# Patient Record
Sex: MALE | Race: OTHER | HISPANIC OR LATINO | URBAN - METROPOLITAN AREA
[De-identification: names, ages, dates, MRNs, and addresses within clinical notes are randomized per-mention and may not be internally consistent; named-entity substitution may affect disease eponyms.]

---

## 2018-01-30 ENCOUNTER — OUTPATIENT (OUTPATIENT)
Dept: OUTPATIENT SERVICES | Facility: HOSPITAL | Age: 18
LOS: 1 days | Discharge: HOME | End: 2018-01-30

## 2018-01-30 DIAGNOSIS — Z00.00 ENCOUNTER FOR GENERAL ADULT MEDICAL EXAMINATION WITHOUT ABNORMAL FINDINGS: ICD-10-CM

## 2018-01-30 DIAGNOSIS — R05 COUGH: ICD-10-CM

## 2018-02-01 ENCOUNTER — OUTPATIENT (OUTPATIENT)
Dept: OUTPATIENT SERVICES | Facility: HOSPITAL | Age: 18
LOS: 1 days | Discharge: HOME | End: 2018-02-01

## 2018-02-01 DIAGNOSIS — R05 COUGH: ICD-10-CM

## 2022-06-02 PROBLEM — Z00.00 ENCOUNTER FOR PREVENTIVE HEALTH EXAMINATION: Status: ACTIVE | Noted: 2022-06-02

## 2023-03-02 ENCOUNTER — NON-APPOINTMENT (OUTPATIENT)
Age: 23
End: 2023-03-02

## 2023-03-02 ENCOUNTER — APPOINTMENT (OUTPATIENT)
Dept: ORTHOPEDIC SURGERY | Facility: CLINIC | Age: 23
End: 2023-03-02
Payer: OTHER MISCELLANEOUS

## 2023-03-02 VITALS — WEIGHT: 170 LBS | BODY MASS INDEX: 25.76 KG/M2 | HEIGHT: 68 IN

## 2023-03-02 PROCEDURE — 72040 X-RAY EXAM NECK SPINE 2-3 VW: CPT

## 2023-03-02 PROCEDURE — 73030 X-RAY EXAM OF SHOULDER: CPT | Mod: RT

## 2023-03-02 PROCEDURE — 72070 X-RAY EXAM THORAC SPINE 2VWS: CPT

## 2023-03-02 PROCEDURE — 99072 ADDL SUPL MATRL&STAF TM PHE: CPT

## 2023-03-02 PROCEDURE — 99203 OFFICE O/P NEW LOW 30 MIN: CPT | Mod: ACP

## 2023-03-02 NOTE — HISTORY OF PRESENT ILLNESS
[de-identified] : 22-year-old male comes in today for an evaluation of his posterior right shoulder shoulder blade pain.  Patient cannot recall any specific trauma.  He believes it is from excessive use of the right arm.  Occasionally feels tingling in the hand.  Patient states he works for [employer] in the [place] department.  He does excessive reaching overhead approximately 10 hours a day over the past 3 months.  Went to urgent care for an evaluation.\par History of asthma, he takes albuterol.  Uses marijuana.

## 2023-03-02 NOTE — IMAGING
[de-identified] : On examination of the right shoulder no swelling, no ecchymosis, no erythema.  Skin is intact.  No palpable tenderness to the anterior shoulder.  No tenderness over the clavicle, no tenderness over the AC joint.  Patient has good motion to the right shoulder to forward flexion abduction internal extra rotation although with pain in the posterior shoulder blade.  He has negative drop arm, good rotator cuff strength.  Full range of motion of the elbow and wrist.  Tenderness appears to be along the medial border of the right scapula into the right flank region.  Tenderness over the right trapezius mild tenderness of the cervical vertebra.\par \par X-ray right shoulder no acute fracture or dislocation\par X-ray thoracic spine-no acute bony abnormality\par X-ray cervical spine-straightening to the cervical curvature

## 2023-03-02 NOTE — ASSESSMENT
[FreeTextEntry1] : At this time recommending physical therapy, heat is appropriate.  I feel that his symptoms may be muscular stemming from his spine, he states that he was given a prescription for naproxen from the urgent care, I will send cyclobenzaprine to the pharmacy, he will take at nighttime.  Patient has total temporary disability not currently working, plans on returning back to work March 20\par \par This patient was seen under the supervision of Dr. Doyle.\par

## 2023-03-08 ENCOUNTER — FORM ENCOUNTER (OUTPATIENT)
Age: 23
End: 2023-03-08

## 2023-03-22 ENCOUNTER — APPOINTMENT (OUTPATIENT)
Dept: ORTHOPEDIC SURGERY | Facility: CLINIC | Age: 23
End: 2023-03-22
Payer: OTHER MISCELLANEOUS

## 2023-03-22 ENCOUNTER — NON-APPOINTMENT (OUTPATIENT)
Age: 23
End: 2023-03-22

## 2023-03-22 PROCEDURE — 99213 OFFICE O/P EST LOW 20 MIN: CPT

## 2023-03-22 NOTE — PHYSICAL EXAM
[de-identified] : TTP midline cervical spine and paraspinal musculature \par \par Strength                                                                    \par Deltoid\par   Right: 5/5; Left: 5/5                     \par Biceps\par   Right: 5/5; Left: 5/5                  \par Triceps     \par   Right: 5/5; Left: 5/5                                \par Wrist Extensors  \par   Right: 5/5; Left: 5/5\par Finger Flexors  \par   Right: 5/5; Left: 5/5\par IO \par   Right: 5/5; Left: 5/5\par \par Sensation\par C5\par   Right: 2/2; Left: 2/2\par C6\par   Right: 2/2; Left: 2/2\par C7\par   Right: 2/2; Left: 2/2\par C8\par   Right: 2/2; Left: 2/2\par T1\par   Right: 2/2; Left: 2/2\par \par Reflexes\par Biceps\par   Right: 2+; Left 2+\par Triceps\par   Right: 2+; Left 2+\par Mcnulty's\par  Right: Negative; L: Negative\par

## 2023-03-22 NOTE — DISCUSSION/SUMMARY
[de-identified] : 22-year-old male with thoracic and cervical sprain.  No surgery.  Meloxicam 15 mg daily.  Follow-up with pain management.  Out of work for 1 month given that he cannot perform his duties secondary to his pain.

## 2023-03-22 NOTE — HISTORY OF PRESENT ILLNESS
[de-identified] : 22-year-old male presents to me with neck and mid back pain.  This is been going on since a work-related accident.  He works in [employer].  Denies pain down his arms or legs.  Denies numbness or tingling.  Denies loss of bladder or bowel.  Has not had a problem like this in the past.  Has begun physical therapy however his pain is significant he cannot work interferes with his day-to-day.

## 2023-03-22 NOTE — DATA REVIEWED
[FreeTextEntry1] : I reviewed the patient's x-ray of the cervical and thoracic spine done March 2, 2023.  There is no fractures.  There is no dislocations.  Very mild scoliosis 1 to 2 degrees could be postural.

## 2023-04-26 ENCOUNTER — APPOINTMENT (OUTPATIENT)
Dept: PAIN MANAGEMENT | Facility: CLINIC | Age: 23
End: 2023-04-26

## 2023-05-08 ENCOUNTER — APPOINTMENT (OUTPATIENT)
Dept: PAIN MANAGEMENT | Facility: CLINIC | Age: 23
End: 2023-05-08
Payer: OTHER MISCELLANEOUS

## 2023-05-08 VITALS — HEIGHT: 68 IN | BODY MASS INDEX: 28.04 KG/M2 | WEIGHT: 185 LBS

## 2023-05-08 PROCEDURE — 99245 OFF/OP CONSLTJ NEW/EST HI 55: CPT

## 2023-05-08 PROCEDURE — 96102W: CUSTOM

## 2023-05-08 NOTE — ASSESSMENT
[FreeTextEntry1] : 23-year-old male presenting with ongoing cervical symptomology.  I will have him continue with physical therapy as prior sessions have been very beneficial.  I will also obtain an MRI of the cervical spine.  Given his current renal function, will keep an work for the next 4 weeks.  For symptom control, I will her trial him on meloxicam 7.5 mg b.i.d. as he has failed naproxen.  He will follow-up in 4 weeks for reassessment.\par \par MRI of the cervical spine as ordered to delineate spine pathology such as disc displacement and stenosis.\par \par Physical therapy of the cervical spine 2-3 times a week for 4-8 weeks stressing a home exercise program of walking, shoulder griddle strengthening,  swimming, elliptical , recumbent bike, Hubert chi and Yoga. Use things that heat like hot shower or icy heat before rehab, exercising and at the beginning of the day, and ice (ice in a bag never directly on the skin) after activity and at the end of the day.\par \par Neuropsychological SOAPP and PCS testing was performed as an evaluation of cognition, mood, personality, behavior to assess likelihood of addiction, misuse, other aberrant medication-related behaviors, and different thoughts and feelings that may be associated with pain. The total time spent rendering and interpreting the service was approximately 20 minutes. Results will be implemented in the appropriate care of the patient \par \par Disability status:\par Temporary total disability. Patient is unable to return to work at this time. \par \par Entered by Karla Phillips, acting as scribe for Dr. Benitez.\par  \par The documentation recorded by the scribe, in my presence, accurately reflects the service I personally performed, and the decisions made by me with my edits as appropriate.\par  \par Best Regards, \par Jay Benitez MD \par Board Certified, Anesthesiology \par Board Certified, Pain Medicine\par

## 2023-05-08 NOTE — PHYSICAL EXAM
[de-identified] : NECK - tenderness over the cervical paraspinals. ROM restricted. Pain with flexion. Positive spurling bilaterally, mainly on the left.

## 2023-05-08 NOTE — DATA REVIEWED
[FreeTextEntry1] : x-ray of the cervical and thoracic spine done March 2, 2023. There is no fractures. There is no dislocations. Very mild scoliosis 1 to 2 degrees could be postural. \par \par SOAPP: Scored a 0 , low risk.\par  \par [place] REGISTRY: Reviewed .  \par  \par UDS: No data obtained today. \par   \par Medications that trigger a UDS: Benzodiazepines (Ativan, Xanax, Valium) etc, Barbiturates, Narcotics (Avinza, Butrans, hydrocodone, Codeine, Atiya, Methadone, Morphine, MS Contin, Opana, oxycodone, Oxycontin, Suboxone etc), Pregabalin (Lyrica), Tramadol (Ultacet, Utram etc), Tapentadol, (Nucynta) and Elist Drugs (cocaine, THC, Etc.)\par  \par Risk factors: Bipolar Illness, positive for any an illicit drugs, history of any ETOH and drug abuse, any signs of diversion, Sharing Meds, selling meds. Non consistent [place] drug reporting and above 120meq of morphine\par  \par Low risk: Patient has combination of a low risk SOAP and no risk factors. UDS would be repeated randomly every quarter

## 2023-05-08 NOTE — HISTORY OF PRESENT ILLNESS
[FreeTextEntry1] : HISTORY OF PRESENT ILLNESS: Mr. Gillette is a 23 year old male complaining of neck pain.\par \par He states the pain is in the neck and radiates into the shoulders, scapula and into the arms. He does note numbness and tingling only in the hands. He has ongoing stiffness and spasms in the neck along with pain with any sort of rotational movements. He rates the pain at a 8/10 on the pain scale. \par \par The pain started after a work related injury. He is a [employer] worker. He states he constantly uses his arms every day using heavy objects. The patient has had this pain for 1 month.  Patient describes the pain as moderate to severe.  During the last month the pain has been constantly with symptoms worsening no typical pattern. Pain described as sharp, pressure-like, dull/aching.  Pain is associated with numbness/pins and needles into the upper extremities.  Pain is increased with sitting, exercising.  Pain is not changed with lying down, standing, walking, relaxing, coughing/sneezing and bowel movements.  Bowel or bladder habits have not changed.\par  \par ACTIVITIES: Patient could walk 5 blocks before the pain starts.  Patient can sit 20 before pain starts.  Patient can stand 20 minutes before pain starts.  The patient often lies down because of pain.  Patient uses no assistive walking device at this time.  Patient has difficulty going to work, performing household chores, doing outside work or shopping, participating in recreational activities and exercising at this time.\par \par PRIOR PAIN TREATMENTS:  Moderate relief with heat treatment.  No relief with surgery, physical therapy, exercise and cold treatment.\par \par Prior Pain Medications:  Naproxen.\par

## 2023-06-08 ENCOUNTER — APPOINTMENT (OUTPATIENT)
Dept: PAIN MANAGEMENT | Facility: CLINIC | Age: 23
End: 2023-06-08
Payer: OTHER MISCELLANEOUS

## 2023-06-08 VITALS — HEIGHT: 68 IN | WEIGHT: 185 LBS | BODY MASS INDEX: 28.04 KG/M2

## 2023-06-08 PROCEDURE — 99214 OFFICE O/P EST MOD 30 MIN: CPT | Mod: ACP

## 2023-06-08 RX ORDER — MELOXICAM 7.5 MG/1
7.5 TABLET ORAL
Qty: 60 | Refills: 0 | Status: DISCONTINUED | COMMUNITY
Start: 2023-05-08 | End: 2023-06-08

## 2023-06-08 RX ORDER — MELOXICAM 15 MG/1
15 TABLET ORAL
Qty: 30 | Refills: 1 | Status: DISCONTINUED | COMMUNITY
Start: 2023-03-22 | End: 2023-06-08

## 2023-06-08 RX ORDER — CYCLOBENZAPRINE HYDROCHLORIDE 5 MG/1
5 TABLET, FILM COATED ORAL
Qty: 14 | Refills: 0 | Status: DISCONTINUED | COMMUNITY
Start: 2023-03-02 | End: 2023-06-08

## 2023-06-08 NOTE — PHYSICAL EXAM
[de-identified] : NECK - tenderness over the cervical paraspinals. ROM restricted. Pain with flexion. Positive spurling bilaterally, mainly on the left.

## 2023-06-08 NOTE — HISTORY OF PRESENT ILLNESS
[FreeTextEntry1] : HISTORY OF PRESENT ILLNESS: Mr. Gillette is a 23 year old male complaining of neck pain.\par \par He states the pain is in the neck and radiates into the shoulders, scapula and into the arms. He does note numbness and tingling only in the hands. He has ongoing stiffness and spasms in the neck along with pain with any sort of rotational movements. He rates the pain at a 8/10 on the pain scale. \par \par The pain started after a work related injury. He is a [employer] worker. He states he constantly uses his arms every day using heavy objects. The patient has had this pain for 1 month.  Patient describes the pain as moderate to severe.  During the last month the pain has been constantly with symptoms worsening no typical pattern. Pain described as sharp, pressure-like, dull/aching.  Pain is associated with numbness/pins and needles into the upper extremities.  Pain is increased with sitting, exercising.  Pain is not changed with lying down, standing, walking, relaxing, coughing/sneezing and bowel movements.  Bowel or bladder habits have not changed.\par  \par ACTIVITIES: Patient could walk 5 blocks before the pain starts.  Patient can sit 20 before pain starts.  Patient can stand 20 minutes before pain starts.  The patient often lies down because of pain.  Patient uses no assistive walking device at this time.  Patient has difficulty going to work, performing household chores, doing outside work or shopping, participating in recreational activities and exercising at this time.\par \par PRIOR PAIN TREATMENTS:  Moderate relief with heat treatment.  No relief with surgery, physical therapy, exercise and cold treatment.\par \par Prior Pain Medications:  Naproxen.\par \par Today: I had the pleasure of seeing SEUN GILLETTE in the office today. Previous history and physical exam findings have been reviewed. \par \par He is currently under our care for neck pain and shoulder blade pain which he is receiving active care for. He continues with severe cervical pains present with stiffness and spasms. Pain is described as sharp, pressure-like, dull/aching.  Pain is associated with numbness/pins and needles into the upper extremities. Pain is increased with sitting, exercising. His pain has only been improving with physical therapy as he states PT has helped decrease the severity of his cervical stiffness and spasms. Unfortunately, since he has finished his PT sessions his pain has became more severe in regards to intensity and duration. He has not yet been able to complete an MRI of his cervical spine due to anxiety and will be undergoing a stand-up MRI. He has not felt relief with prescribed Cyclobenzaprine and Meloxicam. We will trial Tizanidine once at bedtime. He is agreeable. \par \par

## 2023-06-08 NOTE — ASSESSMENT
[FreeTextEntry1] : 23-year-old male presenting with ongoing cervical symptomology. We will provide him with a script for additional physical therapy as he noticed a substantial improvement in functionality with previous sessions. He will undergo an MRI of the cervical spine. We will discontinue Meloxicam and Cyclobenzaprine. We will trial Tizanidine once at bedtime for stiffness and spasms. He will follow-up in 4 weeks for reassessment.\par \par MRI of the cervical spine as ordered to delineate spine pathology such as disc displacement and stenosis.\par \par Physical therapy of the cervical spine 2-3 times a week for 4-8 weeks stressing a home exercise program of walking, shoulder griddle strengthening,  swimming, elliptical , recumbent bike, Hubert chi and Yoga. Use things that heat like hot shower or icy heat before rehab, exercising and at the beginning of the day, and ice (ice in a bag never directly on the skin) after activity and at the end of the day.\par \par Disability status:\par Temporary total disability. Patient is unable to return to work at this time. \par \par Bolivar Curiel PA-C\par Jay Benitez MD\par \par

## 2023-06-20 ENCOUNTER — FORM ENCOUNTER (OUTPATIENT)
Age: 23
End: 2023-06-20

## 2023-07-13 ENCOUNTER — APPOINTMENT (OUTPATIENT)
Dept: PAIN MANAGEMENT | Facility: CLINIC | Age: 23
End: 2023-07-13
Payer: OTHER MISCELLANEOUS

## 2023-07-13 VITALS — HEIGHT: 69 IN | BODY MASS INDEX: 28.14 KG/M2 | WEIGHT: 190 LBS

## 2023-07-13 PROCEDURE — 99213 OFFICE O/P EST LOW 20 MIN: CPT | Mod: ACP

## 2023-07-13 RX ORDER — TIZANIDINE 4 MG/1
4 TABLET ORAL
Qty: 30 | Refills: 0 | Status: DISCONTINUED | COMMUNITY
Start: 2023-06-08 | End: 2023-07-13

## 2023-07-13 NOTE — PHYSICAL EXAM
[de-identified] : NECK - tenderness over the cervical paraspinals. ROM restricted. Pain with flexion. Positive spurling bilaterally, mainly on the left.

## 2023-07-13 NOTE — HISTORY OF PRESENT ILLNESS
[FreeTextEntry1] : HISTORY OF PRESENT ILLNESS: Mr. Gillette is a 23 year old male complaining of neck pain.\par \par He states the pain is in the neck and radiates into the shoulders, scapula and into the arms. He does note numbness and tingling only in the hands. He has ongoing stiffness and spasms in the neck along with pain with any sort of rotational movements. He rates the pain at a 8/10 on the pain scale. \par \par The pain started after a work related injury. He is a [employer] worker. He states he constantly uses his arms every day using heavy objects. The patient has had this pain for 1 month.  Patient describes the pain as moderate to severe.  During the last month the pain has been constantly with symptoms worsening no typical pattern. Pain described as sharp, pressure-like, dull/aching.  Pain is associated with numbness/pins and needles into the upper extremities.  Pain is increased with sitting, exercising.  Pain is not changed with lying down, standing, walking, relaxing, coughing/sneezing and bowel movements.  Bowel or bladder habits have not changed.\par  \par ACTIVITIES: Patient could walk 5 blocks before the pain starts.  Patient can sit 20 before pain starts.  Patient can stand 20 minutes before pain starts.  The patient often lies down because of pain.  Patient uses no assistive walking device at this time.  Patient has difficulty going to work, performing household chores, doing outside work or shopping, participating in recreational activities and exercising at this time.\par \par PRIOR PAIN TREATMENTS:  Moderate relief with heat treatment.  No relief with surgery, physical therapy, exercise and cold treatment.\par \par Prior Pain Medications:  Naproxen.\par \par Today: I had the pleasure of seeing SEUN GILLETTE in the office today. Previous history and physical exam findings have been reviewed. \par \par He is currently under our care for neck pain and shoulder blade pain which he is receiving active care for. He states the pain is in the neck and radiates into the shoulders, scapula and into the arms. He does note numbness and tingling only in the hands. He has ongoing stiffness and spasms in the neck along with pain with any sort of rotational movements. He underwent extensive physical therapy sessions with some improvements. His pain has retuned to baseline since his sessions have not been approved. He has not yet completed an MRI of his cervical spine which he will be undergoing this month.\par \par

## 2023-07-13 NOTE — ASSESSMENT
[FreeTextEntry1] : 23-year-old male presenting with ongoing cervical symptomology. We have planned for him to undergo an MRI of the cervical spine which he was approved for.  We will discontinue Tizanidine as it has not provided relief. We will follow up in 4 weeks to discuss imaging review and further treatment plan. \par \par Disability status:\par Temporary total disability. Patient is unable to return to work at this time. \par \par Bolivar Curiel PA-C\par Jay Benitez MD\par \par

## 2023-08-17 ENCOUNTER — APPOINTMENT (OUTPATIENT)
Dept: PAIN MANAGEMENT | Facility: CLINIC | Age: 23
End: 2023-08-17
Payer: OTHER MISCELLANEOUS

## 2023-08-17 VITALS — HEIGHT: 69 IN | BODY MASS INDEX: 28.14 KG/M2 | WEIGHT: 190 LBS

## 2023-08-17 PROCEDURE — 99213 OFFICE O/P EST LOW 20 MIN: CPT | Mod: ACP

## 2023-08-17 NOTE — ASSESSMENT
[FreeTextEntry1] : 23-year-old male presenting with ongoing cervical symptomology. He is planning to undergo an MRI of his cervical spine this month. We will follow up in 4 weeks to discuss imaging review and further treatment plan.   Disability status: Temporary total disability. Patient is unable to return to work at this time.   MERCEDES Marie MD

## 2023-08-17 NOTE — HISTORY OF PRESENT ILLNESS
[FreeTextEntry1] : HISTORY OF PRESENT ILLNESS: Mr. Gillette is a 23 year old male complaining of neck pain.  He states the pain is in the neck and radiates into the shoulders, scapula and into the arms. He does note numbness and tingling only in the hands. He has ongoing stiffness and spasms in the neck along with pain with any sort of rotational movements. He rates the pain at a 8/10 on the pain scale.   The pain started after a work related injury. He is a [employer] worker. He states he constantly uses his arms every day using heavy objects. The patient has had this pain for 1 month.  Patient describes the pain as moderate to severe.  During the last month the pain has been constantly with symptoms worsening no typical pattern. Pain described as sharp, pressure-like, dull/aching.  Pain is associated with numbness/pins and needles into the upper extremities.  Pain is increased with sitting, exercising.  Pain is not changed with lying down, standing, walking, relaxing, coughing/sneezing and bowel movements.  Bowel or bladder habits have not changed.   ACTIVITIES: Patient could walk 5 blocks before the pain starts.  Patient can sit 20 before pain starts.  Patient can stand 20 minutes before pain starts.  The patient often lies down because of pain.  Patient uses no assistive walking device at this time.  Patient has difficulty going to work, performing household chores, doing outside work or shopping, participating in recreational activities and exercising at this time.  PRIOR PAIN TREATMENTS:  Moderate relief with heat treatment.  No relief with surgery, physical therapy, exercise and cold treatment.  Prior Pain Medications:  Naproxen.  Today: I had the pleasure of seeing SEUN GILLETTE in the office today. Previous history and physical exam findings have been reviewed.   He is currently under our care for neck pain and shoulder blade pain which he is receiving active care for. His pain has been unchanged in terms of severity and intensity. He states the pain is in the neck and radiates into the shoulders, scapula and into the arms. He does note numbness and tingling only in the hands. He has ongoing stiffness and spasms in the neck along with pain with any sort of rotational movements. We have been planning for him to undergo a cervical MRI. Due to his anxiety, he states that he needs to be standing up for the MRI. He states that he had been involved in an accident and is walking on crutches today. He states he has been unable to stand for the MRI and this is the reason it has not been completed yet.

## 2023-09-14 ENCOUNTER — APPOINTMENT (OUTPATIENT)
Dept: PAIN MANAGEMENT | Facility: CLINIC | Age: 23
End: 2023-09-14
Payer: OTHER MISCELLANEOUS

## 2023-09-14 VITALS — HEIGHT: 69 IN | WEIGHT: 190 LBS | BODY MASS INDEX: 28.14 KG/M2

## 2023-09-14 PROCEDURE — 99213 OFFICE O/P EST LOW 20 MIN: CPT | Mod: ACP

## 2023-10-26 ENCOUNTER — APPOINTMENT (OUTPATIENT)
Dept: PAIN MANAGEMENT | Facility: CLINIC | Age: 23
End: 2023-10-26
Payer: OTHER MISCELLANEOUS

## 2023-10-26 VITALS — WEIGHT: 190 LBS | BODY MASS INDEX: 28.14 KG/M2 | HEIGHT: 69 IN

## 2023-10-26 PROCEDURE — 99214 OFFICE O/P EST MOD 30 MIN: CPT | Mod: ACP

## 2023-11-30 ENCOUNTER — APPOINTMENT (OUTPATIENT)
Dept: PAIN MANAGEMENT | Facility: CLINIC | Age: 23
End: 2023-11-30
Payer: OTHER MISCELLANEOUS

## 2023-11-30 VITALS — WEIGHT: 190 LBS | HEIGHT: 69 IN | BODY MASS INDEX: 28.14 KG/M2

## 2023-11-30 DIAGNOSIS — M89.8X1 OTHER SPECIFIED DISORDERS OF BONE, SHOULDER: ICD-10-CM

## 2023-11-30 DIAGNOSIS — M54.12 RADICULOPATHY, CERVICAL REGION: ICD-10-CM

## 2023-11-30 DIAGNOSIS — M54.2 CERVICALGIA: ICD-10-CM

## 2023-11-30 DIAGNOSIS — M47.812 SPONDYLOSIS W/OUT MYELOPATHY OR RADICULOPATHY, CERVICAL REGION: ICD-10-CM

## 2023-11-30 PROCEDURE — 99214 OFFICE O/P EST MOD 30 MIN: CPT | Mod: ACP

## 2023-11-30 RX ORDER — GABAPENTIN 300 MG/1
300 CAPSULE ORAL
Qty: 30 | Refills: 0 | Status: ACTIVE | COMMUNITY
Start: 2023-10-26 | End: 1900-01-01

## 2024-02-29 ENCOUNTER — APPOINTMENT (OUTPATIENT)
Dept: PAIN MANAGEMENT | Facility: CLINIC | Age: 24
End: 2024-02-29